# Patient Record
Sex: MALE | Race: WHITE | Employment: FULL TIME | URBAN - METROPOLITAN AREA
[De-identification: names, ages, dates, MRNs, and addresses within clinical notes are randomized per-mention and may not be internally consistent; named-entity substitution may affect disease eponyms.]

---

## 2018-10-15 ENCOUNTER — HOSPITAL ENCOUNTER (EMERGENCY)
Age: 43
Discharge: HOME | End: 2018-10-15
Attending: EMERGENCY MEDICINE
Payer: COMMERCIAL

## 2018-10-15 VITALS
WEIGHT: 202.8 LBS | SYSTOLIC BLOOD PRESSURE: 135 MMHG | OXYGEN SATURATION: 99 % | BODY MASS INDEX: 25.35 KG/M2 | HEART RATE: 55 BPM | RESPIRATION RATE: 16 BRPM | TEMPERATURE: 98.5 F | DIASTOLIC BLOOD PRESSURE: 72 MMHG

## 2018-10-15 DIAGNOSIS — S61.412A LACERATION OF LEFT HAND WITHOUT FOREIGN BODY, INITIAL ENCOUNTER: Primary | ICD-10-CM

## 2018-10-15 PROCEDURE — 74011250636 HC RX REV CODE- 250/636: Performed by: PHYSICIAN ASSISTANT

## 2018-10-15 PROCEDURE — 90715 TDAP VACCINE 7 YRS/> IM: CPT | Performed by: PHYSICIAN ASSISTANT

## 2018-10-15 PROCEDURE — 90471 IMMUNIZATION ADMIN: CPT

## 2018-10-15 PROCEDURE — 77030031132 HC SUT NYL COVD -A

## 2018-10-15 PROCEDURE — 77030018836 HC SOL IRR NACL ICUM -A

## 2018-10-15 PROCEDURE — 75810000293 HC SIMP/SUPERF WND  RPR

## 2018-10-15 PROCEDURE — 99283 EMERGENCY DEPT VISIT LOW MDM: CPT

## 2018-10-15 PROCEDURE — 74011000250 HC RX REV CODE- 250: Performed by: PHYSICIAN ASSISTANT

## 2018-10-15 RX ORDER — LIDOCAINE HYDROCHLORIDE 20 MG/ML
10 INJECTION, SOLUTION INFILTRATION; PERINEURAL ONCE
Status: COMPLETED | OUTPATIENT
Start: 2018-10-15 | End: 2018-10-15

## 2018-10-15 RX ORDER — BACITRACIN 500 UNIT/G
1 PACKET (EA) TOPICAL
Status: COMPLETED | OUTPATIENT
Start: 2018-10-15 | End: 2018-10-15

## 2018-10-15 RX ADMIN — TETANUS TOXOID, REDUCED DIPHTHERIA TOXOID AND ACELLULAR PERTUSSIS VACCINE, ADSORBED 0.5 ML: 5; 2.5; 8; 8; 2.5 SUSPENSION INTRAMUSCULAR at 22:38

## 2018-10-15 RX ADMIN — BACITRACIN 1 PACKET: 500 OINTMENT TOPICAL at 22:37

## 2018-10-15 RX ADMIN — LIDOCAINE HYDROCHLORIDE 200 MG: 20 INJECTION, SOLUTION INFILTRATION; PERINEURAL at 22:38

## 2018-10-16 NOTE — ED TRIAGE NOTES
TRIAGE NOTE:  Patient arrives with laceration to left hand. Patient reports cut self with steak knife. Unknown last tetanus.

## 2018-10-16 NOTE — ED PROVIDER NOTES
HPI Comments: 41 y.o. male with no significant past medical history, presents ambulatory to the ED with chief complaint of laceration to the left hand. Patient states that approximately two hours ago he was using a knife when he accidentally cut his left hand. Bleeding is controlled in the ED and patient denies decrease in range of motion to the digits of the left hand. Denies significant pain surrounding the wound. Patient is unsure of his current tetanus status. He denies any other injuries or areas of concern, and reports that he was in his usual state of health before this injury occurred. There are no other acute medical concerns at this time. Social hx: Denies Tobacco use; Positive EtOH use; Denies Illicit Drug Abuse PCP: Joshua Rodriguez MD 
 
Note written by Roberto Carlos, as dictated by Saba Oliva PA-C 10:09 PM  
 
The history is provided by the patient. No  was used. History reviewed. No pertinent past medical history. Past Surgical History:  
Procedure Laterality Date  HX APPENDECTOMY  HX ORTHOPAEDIC History reviewed. No pertinent family history. Social History Social History  Marital status:  Spouse name: N/A  
 Number of children: N/A  
 Years of education: N/A Occupational History  Not on file. Social History Main Topics  Smoking status: Never Smoker  Smokeless tobacco: Not on file  Alcohol use Yes  Drug use: No  
 Sexual activity: Not on file Other Topics Concern  Not on file Social History Narrative ALLERGIES: Review of patient's allergies indicates no known allergies. Review of Systems Constitutional: Negative. HENT: Negative for ear discharge. Eyes: Negative for photophobia, pain, discharge and visual disturbance. Respiratory: Negative for apnea, cough, chest tightness and shortness of breath. Cardiovascular: Negative for chest pain, palpitations and leg swelling. Gastrointestinal: Negative for abdominal distention, abdominal pain and blood in stool. Genitourinary: Negative for difficulty urinating, dysuria, flank pain, frequency and hematuria. Musculoskeletal: Negative for back pain, gait problem, joint swelling, myalgias and neck pain. Skin: Positive for wound (laceration to left hand). Negative for color change and pallor. Neurological: Negative for dizziness, syncope, weakness, numbness and headaches. Psychiatric/Behavioral: Negative for behavioral problems and confusion. The patient is not nervous/anxious. Vitals:  
 10/15/18 2040 BP: 138/78 Pulse: (!) 53 Resp: 18 Temp: 98.1 °F (36.7 °C) SpO2: 97% Weight: 92 kg (202 lb 12.8 oz) Physical Exam  
Constitutional: He is oriented to person, place, and time. He appears well-nourished. No distress. HENT:  
Head: Normocephalic and atraumatic. Eyes: Pupils are equal, round, and reactive to light. Neck: Normal range of motion. Cardiovascular: Normal rate and regular rhythm. Pulmonary/Chest: Effort normal and breath sounds normal.  
Abdominal: Soft. There is no tenderness. Musculoskeletal: Normal range of motion. He exhibits no edema. There is a 3 cm linear laceration to the base of the left thumb. FROM. Bleeding is controlled. Neurological: He is alert and oriented to person, place, and time. Skin: Skin is warm and dry. Psychiatric: He has a normal mood and affect. Nursing note and vitals reviewed. Note written by Elise Brown. [facility], as dictated by Anju Fallon PA-C 10:09 PM   
 
MDM Number of Diagnoses or Management Options Laceration of left hand without foreign body, initial encounter:  
  
Amount and/or Complexity of Data Reviewed Discuss the patient with other providers: yes ED Course Wound Repair 
Date/Time: 10/15/2018 10:30 PM 
 Performed by: PAPreparation: skin prepped with Shur-Clens and sterile field established Pre-procedure re-eval: Immediately prior to the procedure, the patient was reevaluated and found suitable for the planned procedure and any planned medications. Location details: left hand Wound length:2.6 - 7.5 cm Anesthesia: local infiltration Anesthesia: 
Local Anesthetic: lidocaine 2% without epinephrine Anesthetic total: 5 mL Foreign bodies: no foreign bodies Irrigation solution: saline Irrigation method: syringe Debridement: none Skin closure: 4-0 nylon Number of sutures: 5 Technique: simple and interrupted Approximation: close Dressing: antibiotic ointment and pressure dressing Patient tolerance: Patient tolerated the procedure well with no immediate complications My total time at bedside, performing this procedure was 1-15 minutes. Patient has been reassessed. Feeling much better. Reviewed medications with patient. Ready to discharge home. 10:53 PM 
Patient's results have been reviewed with them. Patient and/or family have verbally conveyed their understanding and agreement of the patient's signs, symptoms, diagnosis, treatment and prognosis and additionally agree to follow up as recommended or return to the Emergency Room should their condition change prior to follow-up. Discharge instructions have also been provided to the patient with some educational information regarding their diagnosis as well a list of reasons why they would want to return to the ER prior to their follow-up appointment should their condition change.

## 2018-10-16 NOTE — DISCHARGE INSTRUCTIONS

## 2018-10-16 NOTE — ED NOTES
Patient has been medically cleared for discharge at this time. His hand was bandaged with a  Bulky dressing. Given all discharge instructions and education.  He was seen leaving the department with a steady gait and all belongings,